# Patient Record
Sex: FEMALE | Race: WHITE | Employment: FULL TIME | URBAN - METROPOLITAN AREA
[De-identification: names, ages, dates, MRNs, and addresses within clinical notes are randomized per-mention and may not be internally consistent; named-entity substitution may affect disease eponyms.]

---

## 2018-08-17 RX ORDER — METFORMIN HYDROCHLORIDE 500 MG/1
500 TABLET, EXTENDED RELEASE ORAL
COMMUNITY

## 2018-08-17 RX ORDER — GLIMEPIRIDE 1 MG/1
2 TABLET ORAL
COMMUNITY

## 2018-08-17 RX ORDER — CYCLOBENZAPRINE HCL 5 MG
5 TABLET ORAL DAILY PRN
COMMUNITY

## 2018-08-17 RX ORDER — LANOLIN ALCOHOL/MO/W.PET/CERES
5000 CREAM (GRAM) TOPICAL DAILY
COMMUNITY

## 2018-08-17 RX ORDER — AMLODIPINE BESYLATE 5 MG/1
5 TABLET ORAL DAILY
COMMUNITY

## 2018-08-17 RX ORDER — LANOLIN ALCOHOL/MO/W.PET/CERES
3 CREAM (GRAM) TOPICAL NIGHTLY PRN
COMMUNITY

## 2018-08-22 ENCOUNTER — ANESTHESIA EVENT (OUTPATIENT)
Dept: OPERATING ROOM | Age: 58
End: 2018-08-22
Payer: COMMERCIAL

## 2018-08-22 ENCOUNTER — HOSPITAL ENCOUNTER (OUTPATIENT)
Age: 58
Setting detail: OUTPATIENT SURGERY
Discharge: HOME | End: 2018-08-22
Attending: OPHTHALMOLOGY | Admitting: OPHTHALMOLOGY
Payer: COMMERCIAL

## 2018-08-22 ENCOUNTER — ANESTHESIA (OUTPATIENT)
Dept: OPERATING ROOM | Age: 58
End: 2018-08-22
Payer: COMMERCIAL

## 2018-08-22 VITALS
TEMPERATURE: 97.2 F | HEART RATE: 67 BPM | WEIGHT: 158 LBS | OXYGEN SATURATION: 100 % | BODY MASS INDEX: 22.62 KG/M2 | HEIGHT: 70 IN | SYSTOLIC BLOOD PRESSURE: 133 MMHG | DIASTOLIC BLOOD PRESSURE: 58 MMHG | RESPIRATION RATE: 16 BRPM

## 2018-08-22 VITALS — OXYGEN SATURATION: 100 % | SYSTOLIC BLOOD PRESSURE: 142 MMHG | DIASTOLIC BLOOD PRESSURE: 72 MMHG

## 2018-08-22 LAB
GLUCOSE BLD-MCNC: 108 MG/DL (ref 70–99)
PERFORMED ON: ABNORMAL

## 2018-08-22 PROCEDURE — 2580000003 HC RX 258: Performed by: NURSE ANESTHETIST, CERTIFIED REGISTERED

## 2018-08-22 PROCEDURE — 3600000013 HC SURGERY LEVEL 3 ADDTL 15MIN: Performed by: OPHTHALMOLOGY

## 2018-08-22 PROCEDURE — 3600000003 HC SURGERY LEVEL 3 BASE: Performed by: OPHTHALMOLOGY

## 2018-08-22 PROCEDURE — 2580000003 HC RX 258: Performed by: OPHTHALMOLOGY

## 2018-08-22 PROCEDURE — 3700000000 HC ANESTHESIA ATTENDED CARE: Performed by: OPHTHALMOLOGY

## 2018-08-22 PROCEDURE — V2632 POST CHMBR INTRAOCULAR LENS: HCPCS | Performed by: OPHTHALMOLOGY

## 2018-08-22 PROCEDURE — 7100000011 HC PHASE II RECOVERY - ADDTL 15 MIN: Performed by: OPHTHALMOLOGY

## 2018-08-22 PROCEDURE — 2709999900 HC NON-CHARGEABLE SUPPLY: Performed by: OPHTHALMOLOGY

## 2018-08-22 PROCEDURE — 6360000002 HC RX W HCPCS: Performed by: NURSE ANESTHETIST, CERTIFIED REGISTERED

## 2018-08-22 PROCEDURE — 7100000010 HC PHASE II RECOVERY - FIRST 15 MIN: Performed by: OPHTHALMOLOGY

## 2018-08-22 PROCEDURE — 6370000000 HC RX 637 (ALT 250 FOR IP)

## 2018-08-22 PROCEDURE — 3700000001 HC ADD 15 MINUTES (ANESTHESIA): Performed by: OPHTHALMOLOGY

## 2018-08-22 DEVICE — ACRYSOF(R) IQ ASPHERIC IOL SP ACRYLIC FOLDABLELENS WULTRASERT(TM) DELIVERY SYSTEM UV WBLUE LIGHT FILTER. 13.0MM LENGTH 6.0MM ANTERIORASYMMETRIC BICONVEX OPTIC PLANAR HAPTICS.
Type: IMPLANTABLE DEVICE | Site: LENS | Status: FUNCTIONAL
Brand: ACRYSOF ULTRASERT

## 2018-08-22 RX ORDER — MAGNESIUM HYDROXIDE 1200 MG/15ML
LIQUID ORAL PRN
Status: DISCONTINUED | OUTPATIENT
Start: 2018-08-22 | End: 2018-08-22 | Stop reason: HOSPADM

## 2018-08-22 RX ORDER — SODIUM CHLORIDE, SODIUM LACTATE, POTASSIUM CHLORIDE, CALCIUM CHLORIDE 600; 310; 30; 20 MG/100ML; MG/100ML; MG/100ML; MG/100ML
INJECTION, SOLUTION INTRAVENOUS CONTINUOUS PRN
Status: DISCONTINUED | OUTPATIENT
Start: 2018-08-22 | End: 2018-08-22 | Stop reason: SDUPTHER

## 2018-08-22 RX ORDER — PROPOFOL 10 MG/ML
INJECTION, EMULSION INTRAVENOUS PRN
Status: DISCONTINUED | OUTPATIENT
Start: 2018-08-22 | End: 2018-08-22 | Stop reason: SDUPTHER

## 2018-08-22 RX ADMIN — SODIUM CHLORIDE, POTASSIUM CHLORIDE, SODIUM LACTATE AND CALCIUM CHLORIDE: 600; 310; 30; 20 INJECTION, SOLUTION INTRAVENOUS at 12:06

## 2018-08-22 RX ADMIN — PROPOFOL 100 MG: 10 INJECTION, EMULSION INTRAVENOUS at 12:06

## 2018-08-22 RX ADMIN — Medication 0.3 ML: at 10:35

## 2018-08-22 ASSESSMENT — PULMONARY FUNCTION TESTS
PIF_VALUE: 0
PIF_VALUE: 1
PIF_VALUE: 0

## 2018-08-22 ASSESSMENT — COPD QUESTIONNAIRES: CAT_SEVERITY: MILD

## 2018-08-22 ASSESSMENT — PAIN - FUNCTIONAL ASSESSMENT: PAIN_FUNCTIONAL_ASSESSMENT: 0-10

## 2018-08-22 NOTE — BRIEF OP NOTE
Brief Postoperative Note  ______________________________________________________________    Patient: Lupe Rincon  YOB: 1960  MRN: [id number]  Date of Procedure: 8/22/2018    Pre-Op Diagnosis: CATARACT RIGHT EYE    Post-Op Diagnosis: Same       Procedure(s):  PACOEMULSIFICATION OF CATARACT RIGHT EYE WITH INTRAOCULAR LENS IMPLANT    Anesthesia: Monitor Anesthesia Care    Surgeon(s):  Rohini De Santiago MD    Staff:  Scrub Person First: Rona Dunlap     Estimated Blood Loss: * No values recorded between 8/22/2018 12:17 PM and 8/22/2018 12:45 PM * None    Complications: None    Specimens:   * No specimens in log *    Implants:    Implant Name Type Inv.  Item Serial No.  Lot No. LRB No. Used   LENS IOL [id number] Latha Bess [id number] Eye LENS IOL Mariam Payne [id number] HAIM   Right 1         Drains:      Findings: none    251 Carole Cam MD  Date: 8/22/2018  Time: 12:45 PM

## 2018-08-22 NOTE — H&P
I have examined the patient and reviewed the history and physical and find no relevant changes. I have reviewed with the patient and/or family the risks, benefits, and alternatives to the procedure and they have agreed to proceed.     Shelbi Bragg

## 2018-08-22 NOTE — OP NOTE
with BSS to separate the capsule and the cataract. The  cataract was seen to be spinning easily within the capsular bag and at this  point, the phacoemulsification hand piece was called for. Using a divide and conquer technique, the phacoemulsifier cut the lens into  four pieces approximately following the pattern of a cross. The grooves  were cut deeply and then the lens was cracked along these axes. The lens  quarters were then rotated into the mid pupillary space and  phacoemulsified. The IA hand piece removed all the cortical material.  A  JumpSeller  was used to polish the posterior capsule. Viscoat and  Provisc were used to fill the capsular bag. The incision was opened  slightly with a crescent knife and an Michael acrylic foldable lens of the  appropriate power was called for. The lens was loaded into the injector  and injected into the eye. The lead haptic was injected into the capsular  bag with the trailing haptic left in the pupillary space. Using a Sinskey  hook, the lens was gently nudged into the capsular bag and rotated into  position. After noting that the alignment and centration was adequate, the  IA hand piece was called for and used to remove all the viscoelastic  material.  Miostat was then injected through the side port incision to  bring the pupil down. The wound was checked to make sure it was water  tight. At this time, 2-3 drops of timolol and 2-3 drops of Vigamox were  placed on the eye. The eye was taped closed, patched and shielded. The  patient went to the recovery room in good condition. ADDENDUM:  The phaco time was 8.40 CDE, 200 mL of fluid. The patient had a  near-clear, no-stitch surgery, axis 180. Tolerated the procedure well and  went to the recovery room in good condition.         Grant Sawant MD    D: 08/22/2018 12:57:27       T: 08/22/2018 15:10:58     [id number]  Job#: [id number]     Doc#: [id number]    CC:

## 2018-08-22 NOTE — ANESTHESIA POSTPROCEDURE EVALUATION
Department of Anesthesiology  Postprocedure Note    Patient: Malachi Peralta  MRN: [id number]  YOB: 1960  Date of evaluation: 8/22/2018  Time:  1:12 PM     Procedure Summary     Date:  08/22/18 Room / Location:  [address] [place] [address] [facility] OR    Anesthesia Start:  8501 Anesthesia Stop:  8245    Procedure:  PACOEMULSIFICATION OF CATARACT RIGHT EYE WITH INTRAOCULAR LENS IMPLANT (Right Eye) Diagnosis:  (CATARACT RIGHT EYE)    Surgeon:  Juan J Thurston MD Responsible Provider:  Monserrat Camargo MD    Anesthesia Type:  MAC ASA Status:  3          Anesthesia Type: MAC    Sandy Phase I: Sandy Score: 10    Sandy Phase II: Sandy Score: 10    Last vitals: Reviewed and per EMR flowsheets.        Anesthesia Post Evaluation    Patient location during evaluation: PACU  Airway patency: patent  Nausea & Vomiting: no nausea  Complications: no  Cardiovascular status: blood pressure returned to baseline  Respiratory status: acceptable  Hydration status: euvolemic

## 2018-08-22 NOTE — ANESTHESIA PRE PROCEDURE
cyclopentolate 1%, moxifloxacin 0.5%, ketorolac 0.5% in lidocaine 2% jelly ophthalmic solution  0.3 mL Right Eye Q10 Min PRN Christine Willis MD        bupivacaine 0.75% and lidocaine 2% in hylenex injection (10.5 mL)   Intraocular Once Christine Willis MD        bupivacaine 0.75% and lidocaine 2% in hylenex injection (4.5 mL)   Intraocular Once Christine Willis MD        OPHTHALMIC SOLUTION COMPOUND ophtahlmic solution                Allergies:     Allergies   Allergen Reactions    Buspar [Buspirone Hcl]     Serzone [Nefazodone Hydrochloride]     Demerol Hcl [Meperidine] Hives       Problem List:    Patient Active Problem List   Diagnosis Code    Depression F32.9    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       Past Medical History:        Diagnosis Date    Arterial spasm ([facility] [address]     chest    COPD (chronic obstructive pulmonary disease) ([facility] [address]     Depression     Diabetes mellitus ([facility] [address]     Hypertension     Post-menopausal        Past Surgical History:        Procedure Laterality Date    CARDIAC CATHETERIZATION       SECTION      TUBAL LIGATION      WRIST GANGLION EXCISION Left 1990       Social History:    Social History   Substance Use Topics    Smoking status: Former Smoker     Packs/day: 1.00     Years: 24.00     Quit date: 2012    Smokeless tobacco: Not on file    Alcohol use No                                Counseling given: Not Answered      Vital Signs (Current):   Vitals:    18 1217   Weight: 158 lb (71.7 kg)   Height: 5' 10\" (1.778 m)                                              BP Readings from Last 3 Encounters:   12 121/68   11 140/80       NPO Status: Time of last liquid consumption: 2355                        Time of last solid consumption: 2300                        Date of last liquid consumption: 18                        Date of last solid food consumption: 18    BMI:   Wt Readings from Last 3 Encounters:   18 158 lb (71.7 kg)   12 138 lb (62.6 kg)   08/25/11 136 lb (61.7 kg)     Body mass index is 22.67 kg/m². CBC:   Lab Results   Component Value Date    WBC 9.6 07/09/2012    RBC 4.48 07/09/2012    HGB 14.6 07/09/2012    HCT 41.7 07/09/2012    MCV 93.1 07/09/2012    RDW 12.9 07/09/2012     07/09/2012       CMP:   Lab Results   Component Value Date     07/09/2012    K 4.1 07/09/2012     07/09/2012    CO2 28 07/09/2012    BUN 14 07/09/2012    CREATININE 1.0 07/09/2012    GFRAA >60 07/09/2012    AGRATIO 2.2 07/09/2012    GLUCOSE 106 07/09/2012    PROT 7.2 07/09/2012    CALCIUM 10.2 07/09/2012    BILITOT 0.64 07/09/2012    ALKPHOS 112 07/09/2012    AST 18 07/09/2012    ALT 12 07/09/2012       POC Tests: No results for input(s): POCGLU, POCNA, POCK, POCCL, POCBUN, POCHEMO, POCHCT in the last 72 hours. Coags: No results found for: PROTIME, INR, APTT    HCG (If Applicable): No results found for: PREGTESTUR, PREGSERUM, HCG, HCGQUANT     ABGs: No results found for: PHART, PO2ART, EPP2WPF, DFV5WQH, BEART, X6TDOZAB     Type & Screen (If Applicable):  No results found for: LABABO, [address]    Anesthesia Evaluation  Patient summary reviewed no history of anesthetic complications:   Airway: Mallampati: II  TM distance: >3 FB   Neck ROM: full  Mouth opening: > = 3 FB Dental:    (+) edentulous, upper dentures and lower dentures      Pulmonary:   (+) COPD: mild,                             Cardiovascular:    (+) hypertension:,                   Neuro/Psych:   (+) psychiatric history:            GI/Hepatic/Renal: Neg GI/Hepatic/Renal ROS       (-) GERD, liver disease and no renal disease       Endo/Other:    (+) DiabetesType II DM, , .                 Abdominal:           Vascular: negative vascular ROS. Anesthesia Plan      MAC     ASA 3       Induction: intravenous. Anesthetic plan and risks discussed with patient. Plan discussed with CRNA.           All questions answered and agrees with Shila Ribeiro MD   8/22/2018

## 2018-09-13 NOTE — PROGRESS NOTES
Pt. States nothing has changed in her medical hx since her first cataract surgery in 8-18. She also said Dr. Paul Metz office moved her arrival time up to 0730 due to a cancellation.

## 2018-09-18 ENCOUNTER — ANESTHESIA EVENT (OUTPATIENT)
Dept: OPERATING ROOM | Age: 58
End: 2018-09-18
Payer: COMMERCIAL

## 2018-09-19 ENCOUNTER — ANESTHESIA (OUTPATIENT)
Dept: OPERATING ROOM | Age: 58
End: 2018-09-19
Payer: COMMERCIAL

## 2018-09-19 ENCOUNTER — HOSPITAL ENCOUNTER (OUTPATIENT)
Age: 58
Setting detail: OUTPATIENT SURGERY
Discharge: HOME | End: 2018-09-19
Attending: OPHTHALMOLOGY | Admitting: OPHTHALMOLOGY
Payer: COMMERCIAL

## 2018-09-19 VITALS
BODY MASS INDEX: 22.62 KG/M2 | HEART RATE: 66 BPM | WEIGHT: 158 LBS | TEMPERATURE: 97.2 F | SYSTOLIC BLOOD PRESSURE: 132 MMHG | RESPIRATION RATE: 18 BRPM | DIASTOLIC BLOOD PRESSURE: 62 MMHG | HEIGHT: 70 IN | OXYGEN SATURATION: 99 %

## 2018-09-19 VITALS — OXYGEN SATURATION: 100 % | SYSTOLIC BLOOD PRESSURE: 134 MMHG | DIASTOLIC BLOOD PRESSURE: 69 MMHG

## 2018-09-19 LAB
GLUCOSE BLD-MCNC: 102 MG/DL (ref 70–99)
PERFORMED ON: ABNORMAL

## 2018-09-19 PROCEDURE — 3600000003 HC SURGERY LEVEL 3 BASE: Performed by: OPHTHALMOLOGY

## 2018-09-19 PROCEDURE — 2709999900 HC NON-CHARGEABLE SUPPLY: Performed by: OPHTHALMOLOGY

## 2018-09-19 PROCEDURE — 3600000013 HC SURGERY LEVEL 3 ADDTL 15MIN: Performed by: OPHTHALMOLOGY

## 2018-09-19 PROCEDURE — 6370000000 HC RX 637 (ALT 250 FOR IP): Performed by: OPHTHALMOLOGY

## 2018-09-19 PROCEDURE — 7100000011 HC PHASE II RECOVERY - ADDTL 15 MIN: Performed by: OPHTHALMOLOGY

## 2018-09-19 PROCEDURE — 6360000002 HC RX W HCPCS: Performed by: OPHTHALMOLOGY

## 2018-09-19 PROCEDURE — 2580000003 HC RX 258: Performed by: ANESTHESIOLOGY

## 2018-09-19 PROCEDURE — 2500000003 HC RX 250 WO HCPCS: Performed by: OPHTHALMOLOGY

## 2018-09-19 PROCEDURE — V2632 POST CHMBR INTRAOCULAR LENS: HCPCS | Performed by: OPHTHALMOLOGY

## 2018-09-19 PROCEDURE — 7100000010 HC PHASE II RECOVERY - FIRST 15 MIN: Performed by: OPHTHALMOLOGY

## 2018-09-19 PROCEDURE — 6360000002 HC RX W HCPCS: Performed by: NURSE ANESTHETIST, CERTIFIED REGISTERED

## 2018-09-19 PROCEDURE — 3700000001 HC ADD 15 MINUTES (ANESTHESIA): Performed by: OPHTHALMOLOGY

## 2018-09-19 PROCEDURE — 3700000000 HC ANESTHESIA ATTENDED CARE: Performed by: OPHTHALMOLOGY

## 2018-09-19 DEVICE — ACRYSOF(R) IQ ASPHERIC IOL SP ACRYLIC FOLDABLELENS WULTRASERT(TM) DELIVERY SYSTEM UV WBLUE LIGHT FILTER. 13.0MM LENGTH 6.0MM ANTERIORASYMMETRIC BICONVEX OPTIC PLANAR HAPTICS.
Type: IMPLANTABLE DEVICE | Site: ANTERIOR CHAMBER | Status: FUNCTIONAL
Brand: ACRYSOF ULTRASERT

## 2018-09-19 RX ORDER — SODIUM CHLORIDE 0.9 % (FLUSH) 0.9 %
10 SYRINGE (ML) INJECTION EVERY 12 HOURS SCHEDULED
Status: DISCONTINUED | OUTPATIENT
Start: 2018-09-19 | End: 2018-09-19 | Stop reason: HOSPADM

## 2018-09-19 RX ORDER — HYDRALAZINE HYDROCHLORIDE 20 MG/ML
5 INJECTION INTRAMUSCULAR; INTRAVENOUS EVERY 10 MIN PRN
Status: DISCONTINUED | OUTPATIENT
Start: 2018-09-19 | End: 2018-09-19 | Stop reason: HOSPADM

## 2018-09-19 RX ORDER — PROPOFOL 10 MG/ML
INJECTION, EMULSION INTRAVENOUS PRN
Status: DISCONTINUED | OUTPATIENT
Start: 2018-09-19 | End: 2018-09-19 | Stop reason: SDUPTHER

## 2018-09-19 RX ORDER — PROMETHAZINE HYDROCHLORIDE 25 MG/ML
6.25 INJECTION, SOLUTION INTRAMUSCULAR; INTRAVENOUS
Status: DISCONTINUED | OUTPATIENT
Start: 2018-09-19 | End: 2018-09-19 | Stop reason: HOSPADM

## 2018-09-19 RX ORDER — SODIUM CHLORIDE, SODIUM LACTATE, POTASSIUM CHLORIDE, CALCIUM CHLORIDE 600; 310; 30; 20 MG/100ML; MG/100ML; MG/100ML; MG/100ML
INJECTION, SOLUTION INTRAVENOUS CONTINUOUS
Status: DISCONTINUED | OUTPATIENT
Start: 2018-09-19 | End: 2018-09-19 | Stop reason: HOSPADM

## 2018-09-19 RX ORDER — LABETALOL HYDROCHLORIDE 5 MG/ML
5 INJECTION, SOLUTION INTRAVENOUS EVERY 10 MIN PRN
Status: DISCONTINUED | OUTPATIENT
Start: 2018-09-19 | End: 2018-09-19 | Stop reason: HOSPADM

## 2018-09-19 RX ORDER — OXYCODONE HYDROCHLORIDE AND ACETAMINOPHEN 5; 325 MG/1; MG/1
1 TABLET ORAL PRN
Status: DISCONTINUED | OUTPATIENT
Start: 2018-09-19 | End: 2018-09-19 | Stop reason: HOSPADM

## 2018-09-19 RX ORDER — MORPHINE SULFATE 2 MG/ML
2 INJECTION, SOLUTION INTRAMUSCULAR; INTRAVENOUS EVERY 5 MIN PRN
Status: DISCONTINUED | OUTPATIENT
Start: 2018-09-19 | End: 2018-09-19 | Stop reason: HOSPADM

## 2018-09-19 RX ORDER — SODIUM CHLORIDE 0.9 % (FLUSH) 0.9 %
10 SYRINGE (ML) INJECTION PRN
Status: DISCONTINUED | OUTPATIENT
Start: 2018-09-19 | End: 2018-09-19 | Stop reason: HOSPADM

## 2018-09-19 RX ORDER — DIPHENHYDRAMINE HYDROCHLORIDE 50 MG/ML
12.5 INJECTION INTRAMUSCULAR; INTRAVENOUS
Status: DISCONTINUED | OUTPATIENT
Start: 2018-09-19 | End: 2018-09-19 | Stop reason: HOSPADM

## 2018-09-19 RX ORDER — ONDANSETRON 2 MG/ML
4 INJECTION INTRAMUSCULAR; INTRAVENOUS PRN
Status: DISCONTINUED | OUTPATIENT
Start: 2018-09-19 | End: 2018-09-19 | Stop reason: HOSPADM

## 2018-09-19 RX ORDER — LIDOCAINE HYDROCHLORIDE 10 MG/ML
1 INJECTION, SOLUTION EPIDURAL; INFILTRATION; INTRACAUDAL; PERINEURAL
Status: DISCONTINUED | OUTPATIENT
Start: 2018-09-19 | End: 2018-09-19 | Stop reason: HOSPADM

## 2018-09-19 RX ORDER — OXYCODONE HYDROCHLORIDE AND ACETAMINOPHEN 5; 325 MG/1; MG/1
2 TABLET ORAL PRN
Status: DISCONTINUED | OUTPATIENT
Start: 2018-09-19 | End: 2018-09-19 | Stop reason: HOSPADM

## 2018-09-19 RX ORDER — MORPHINE SULFATE 2 MG/ML
1 INJECTION, SOLUTION INTRAMUSCULAR; INTRAVENOUS EVERY 5 MIN PRN
Status: DISCONTINUED | OUTPATIENT
Start: 2018-09-19 | End: 2018-09-19 | Stop reason: HOSPADM

## 2018-09-19 RX ADMIN — PROPOFOL 70 MG: 10 INJECTION, EMULSION INTRAVENOUS at 09:21

## 2018-09-19 RX ADMIN — SODIUM CHLORIDE, POTASSIUM CHLORIDE, SODIUM LACTATE AND CALCIUM CHLORIDE: 600; 310; 30; 20 INJECTION, SOLUTION INTRAVENOUS at 08:14

## 2018-09-19 RX ADMIN — Medication 0.3 ML: at 08:02

## 2018-09-19 RX ADMIN — SODIUM CHLORIDE, POTASSIUM CHLORIDE, SODIUM LACTATE AND CALCIUM CHLORIDE: 600; 310; 30; 20 INJECTION, SOLUTION INTRAVENOUS at 09:20

## 2018-09-19 RX ADMIN — Medication 0.3 ML: at 08:14

## 2018-09-19 RX ADMIN — Medication 0.3 ML: at 08:24

## 2018-09-19 ASSESSMENT — PULMONARY FUNCTION TESTS
PIF_VALUE: 1

## 2018-09-19 ASSESSMENT — PAIN - FUNCTIONAL ASSESSMENT: PAIN_FUNCTIONAL_ASSESSMENT: 0-10

## 2018-09-19 ASSESSMENT — PAIN SCALES - GENERAL: PAINLEVEL_OUTOF10: 0

## 2018-09-19 ASSESSMENT — COPD QUESTIONNAIRES: CAT_SEVERITY: MILD

## 2018-09-19 NOTE — ANESTHESIA POSTPROCEDURE EVALUATION
Department of Anesthesiology  Postprocedure Note    Patient: Bettina Nelson  MRN: [id number]  YOB: 1960  Date of evaluation: 9/19/2018  Time:  10:02 AM     Procedure Summary     Date:  09/19/18 Room / Location:  [facility] OR [address] [place] [address] OR    Anesthesia Start:  9222 Anesthesia Stop:  8042    Procedure:  PHACOEMULSIFICATION OF CATARACT LEFT  EYE WITH INTRAOCULAR LENS IMPLANT (Left Eye) Diagnosis:  (CATARACT LEFT  EYE)    Surgeon:  Rohit Shannon MD Responsible Provider:  Marlyn Dunham MD    Anesthesia Type:  MAC ASA Status:  3          Anesthesia Type: MAC    Sandy Phase I: Sandy Score: 10    Sandy Phase II: Sandy Score: 10    Last vitals: Reviewed and per EMR flowsheets.        Anesthesia Post Evaluation    Patient location during evaluation: PACU  Patient participation: complete - patient participated  Level of consciousness: awake and alert  Pain score: 0  Airway patency: patent  Nausea & Vomiting: no nausea and no vomiting  Complications: no  Cardiovascular status: blood pressure returned to baseline  Respiratory status: acceptable  Hydration status: stable

## 2018-09-19 NOTE — ANESTHESIA PRE PROCEDURE
(1.778 m)                                              BP Readings from Last 3 Encounters:   09/19/18 (!) 115/90   08/22/18 (!) 133/58   08/22/18 (!) 142/72       NPO Status: Time of last liquid consumption: 2300                        Time of last solid consumption: 2230                        Date of last liquid consumption: 09/18/18                        Date of last solid food consumption: 09/18/18    BMI:   Wt Readings from Last 3 Encounters:   09/19/18 158 lb (71.7 kg)   08/17/18 158 lb (71.7 kg)   07/09/12 138 lb (62.6 kg)     Body mass index is 22.67 kg/m².     CBC:   Lab Results   Component Value Date    WBC 9.6 07/09/2012    RBC 4.48 07/09/2012    HGB 14.6 07/09/2012    HCT 41.7 07/09/2012    MCV 93.1 07/09/2012    RDW 12.9 07/09/2012     07/09/2012       CMP:   Lab Results   Component Value Date     07/09/2012    K 4.1 07/09/2012     07/09/2012    CO2 28 07/09/2012    BUN 14 07/09/2012    CREATININE 1.0 07/09/2012    GFRAA >60 07/09/2012    AGRATIO 2.2 07/09/2012    GLUCOSE 106 07/09/2012    PROT 7.2 07/09/2012    CALCIUM 10.2 07/09/2012    BILITOT 0.64 07/09/2012    ALKPHOS 112 07/09/2012    AST 18 07/09/2012    ALT 12 07/09/2012       POC Tests:   Recent Labs      09/19/18   0812   POCGLU  102*       Coags: No results found for: PROTIME, INR, APTT    HCG (If Applicable): No results found for: PREGTESTUR, PREGSERUM, HCG, HCGQUANT     ABGs: No results found for: PHART, PO2ART, IVO4AKN, KXR7ZAA, BEART, V3INBFAV     Type & Screen (If Applicable):  No results found for: LABABO, LABRH    Anesthesia Evaluation    Airway: Mallampati: III  TM distance: <3 FB   Neck ROM: full  Mouth opening: > = 3 FB Dental:    (+) lower dentures and upper dentures      Pulmonary:normal exam  breath sounds clear to auscultation  (+) COPD: mild,                             Cardiovascular:    (+) hypertension:,         Rhythm: regular  Rate: normal                 ROS comment: Coronary vasospasm on Calcium channel blocker     Neuro/Psych:   (+) psychiatric history:            GI/Hepatic/Renal: Neg GI/Hepatic/Renal ROS            Endo/Other:    (+) DiabetesType II DM, , .                 Abdominal:           Vascular:                                      Anesthesia Plan      MAC     ASA 3       Induction: intravenous. Anesthetic plan and risks discussed with patient. Plan discussed with CRNA.                   Hanny Mccann MD   9/19/2018

## 2018-09-19 NOTE — H&P
I have examined the patient and reviewed the history and physical and find no relevant changes. I have reviewed with the patient and/or family the risks, benefits, and alternatives to the procedure and they have agreed to proceed.     Saroj Murray.

## 2018-09-19 NOTE — PROGRESS NOTES
Pt. Left ambulatory without difficulty to go home. Pt aware of appt tomorrow at [facility] @ 6880. Verbalizes understanding  Of drops.

## (undated) DEVICE — 3M(TM) TRANSPORE SURGICAL TAPE 1527-1: Brand: 3M™ TRANSPORE™

## (undated) DEVICE — SOLUTION IRRIG 250ML STRL H2O PLAS POUR BTL USP

## (undated) DEVICE — TOWEL,OR,DSP,ST,BLUE,STD,4/PK,20PK/CS: Brand: MEDLINE

## (undated) DEVICE — SET GRAV VENT NVENT CK VLV 3 NDL FREE PRT 10 GTT

## (undated) DEVICE — GLOVE,SURG,SENSICARE,ALOE,LF,PF,7: Brand: MEDLINE

## (undated) DEVICE — Device

## (undated) DEVICE — GLOVE ORANGE PI 8   MSG9080

## (undated) DEVICE — SURGICAL PROCEDURE PACK EYE ANDRSN

## (undated) DEVICE — SOLUTION IV 1000ML LAC RINGERS PH 6.5 INJ USP VIAFLX PLAS

## (undated) DEVICE — SET ADMIN PRIMING 7ML L30IN 7.35LB 20 GTT 2ND RLER CLMP

## (undated) DEVICE — NEEDLE FLTR 19GA L1.5IN WALL THK5UM BRN POLYPR HUB S STL

## (undated) DEVICE — 3M™ TEGADERM™ TRANSPARENT FILM DRESSING FRAME STYLE, 1624W, 2-3/8 IN X 2-3/4 IN (6 CM X 7 CM), 100/CT 4CT/CASE: Brand: 3M™ TEGADERM™

## (undated) DEVICE — CATHETER IV 20GA L1.25IN PNK FEP SFTY STR HUB RADPQ DISP

## (undated) DEVICE — SILICONE I/A TIP STRAIGHT: Brand: ALCON

## (undated) DEVICE — METER ACCU-CHEK INFORM BASE GLUCOSE

## (undated) DEVICE — NEEDLE HYPO 25GA L1.5IN BLU POLYPR HUB S STL REG BVL STR

## (undated) DEVICE — GLOVE SURG SZ 65 L12IN FNGR THK94MIL STD WHT LTX FREE